# Patient Record
Sex: FEMALE | Race: WHITE | URBAN - METROPOLITAN AREA
[De-identification: names, ages, dates, MRNs, and addresses within clinical notes are randomized per-mention and may not be internally consistent; named-entity substitution may affect disease eponyms.]

---
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
## 2024-05-07 ENCOUNTER — HOSPITAL ENCOUNTER (OUTPATIENT)
Dept: OCCUPATIONAL THERAPY | Age: 24
Setting detail: THERAPIES SERIES
Discharge: HOME | End: 2024-05-07
Payer: COMMERCIAL

## 2024-05-07 PROCEDURE — 97165 OT EVAL LOW COMPLEX 30 MIN: CPT

## 2024-05-07 PROCEDURE — 97537 COMMUNITY/WORK REINTEGRATION: CPT

## 2024-05-07 NOTE — PROGRESS NOTES
Occupational Therapy  Name: Judi Hayward  2000  Date: 5/7/2024  7:50 AM  Dr. 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    Prior Level of Functioning: Independent with ADL's, IADL's.  Works full time.__  Seizure free for 1 year: yes, last one in October 2021  Hearing Aids: No  Glasses/contacts: yes  Mobility Status: No AD  Is client able to transfer into car and/or load mobility device in/out of car: yes    Driving History:    License # [id number]  Restrictions on License: Corrective Lens  Expiration date: 2/5/31  Last time client drove: Drove to this appointment.  Car Make/Model and transmission type: [employer], automatic  Use of Technology in Car (I.e. blind spot reducing tech, raúl departure assist, etc): back up camera  Driving Habits: Frequency: everyday  Night: yes  Snow: yes  Highway: ?yes  Traffic tickets in last 5 years:  DENIES     Accidents in last 5 years:  DENIES            VISION:    Acuity: ([place] law =20/40 night driving; 20/70 day driving): ____20/40____ with corrective lenses    Peripheral field: ([place] Law requires 70? visual field on both sides of a fixation point for a non-restricted license or 45? on the other side)  INTACT      Color Vision:  INTACT         Saccades: (ability to rapidly change fixation from one point in the visual field to another)    INTACT     Pursuits: (the continued fixation of a moving object)    INTACT     Depth Perception:    INTACT       Motor Free Visual Perception Test (MVPT): Visual Closure Section (Measures visual discrimination, visual memory, visual closure, visual organization, and figure ground skills)    INTACT (9/11 or better and average time

## 2024-05-07 NOTE — PROGRESS NOTES
5/7/2024    Dear Dr. Benavides,      Judi Hayward (MR# [id number]) was seen by Occupational Therapy on 5/7/2024 for a ’s Evaluation at [facility].  As you know, Ms. Hayward requires an evaluation by a certified  rehabilitation specialist to continue driving.  She wants to maintain driving to be independent in community mobility, work, and leisure skills.    Ms. Hayward passed tests for visual acuity, saccades, pursuits, depth perception, color vision, peripheral vision, contrast sensitivity and recognition of traffic signs and signals.  She was administered the Motor Free Visual Perceptual Test- Visual Closure Section only and scored within normal limits. Her cognition is within normal limits.  She demonstrated functional physical capacity for driving.    Behind the wheel, Ms. Hayward was able to manipulate all vehicle controls.  She drove on secondary and primary roads in light to moderately heavy traffic.  She demonstrated the ability to park, back up, maintain speed and traffic flow, change lanes and follow directions.  Responses to situations encountered were appropriate.      Based on the results of this evaluation, it appears that Ms. Hayward is a suitable candidate to maintain driving.  She is as safe as the driving public.  The final decision remains with the physician.  Please inform Ms. Hayward of your decision.  I can be reached at [phone] if questions arise.  Thank you for this referral.    Sincerely,      Temo Gardner, ALANNA, LDI   Certified  Rehabilitation Specialist

## (undated) NOTE — MR AVS SNAPSHOT
[facility]  [address]  [phone]               Thank you for choosing us for your health care visit with Ben Sun MD.  We are glad to serve you and happy to provide you with this summary Tips for making healthy food choices  -   Enjoy your food, but eat less. Fully enjoy your food when eating. Don’t eat while distracted and slow down. Avoid over sized portions. Don’t eat while when you’re bored.      EAT THESE FOODS MORE OFTEN: EAT T